# Patient Record
Sex: MALE | Race: WHITE | NOT HISPANIC OR LATINO | URBAN - METROPOLITAN AREA
[De-identification: names, ages, dates, MRNs, and addresses within clinical notes are randomized per-mention and may not be internally consistent; named-entity substitution may affect disease eponyms.]

---

## 2017-01-01 ENCOUNTER — COMMUNICATION - HEALTHEAST (OUTPATIENT)
Dept: ADMINISTRATIVE | Facility: CLINIC | Age: 82
End: 2017-01-01

## 2017-01-01 ENCOUNTER — OFFICE VISIT - HEALTHEAST (OUTPATIENT)
Dept: INTERNAL MEDICINE | Facility: CLINIC | Age: 82
End: 2017-01-01

## 2017-01-01 ENCOUNTER — COMMUNICATION - HEALTHEAST (OUTPATIENT)
Dept: INTERNAL MEDICINE | Facility: CLINIC | Age: 82
End: 2017-01-01

## 2017-01-01 ENCOUNTER — RECORDS - HEALTHEAST (OUTPATIENT)
Dept: ADMINISTRATIVE | Facility: OTHER | Age: 82
End: 2017-01-01

## 2017-01-01 ENCOUNTER — COMMUNICATION - HEALTHEAST (OUTPATIENT)
Dept: SCHEDULING | Facility: CLINIC | Age: 82
End: 2017-01-01

## 2017-01-01 ENCOUNTER — RECORDS - HEALTHEAST (OUTPATIENT)
Dept: GENERAL RADIOLOGY | Facility: CLINIC | Age: 82
End: 2017-01-01

## 2017-01-01 DIAGNOSIS — I10 ESSENTIAL HYPERTENSION WITH GOAL BLOOD PRESSURE LESS THAN 140/90: ICD-10-CM

## 2017-01-01 DIAGNOSIS — I50.23: ICD-10-CM

## 2017-01-01 DIAGNOSIS — I10 ESSENTIAL HYPERTENSION: ICD-10-CM

## 2017-01-01 DIAGNOSIS — Z95.2 H/O AORTIC VALVE REPLACEMENT: ICD-10-CM

## 2017-01-01 DIAGNOSIS — I71.40 AAA (ABDOMINAL AORTIC ANEURYSM) WITHOUT RUPTURE (H): ICD-10-CM

## 2017-01-01 DIAGNOSIS — N18.30 CKD (CHRONIC KIDNEY DISEASE) STAGE 3, GFR 30-59 ML/MIN (H): ICD-10-CM

## 2017-01-01 DIAGNOSIS — G47.30 SLEEP APNEA: ICD-10-CM

## 2017-01-01 DIAGNOSIS — G40.909 SEIZURE DISORDER (H): ICD-10-CM

## 2017-01-01 DIAGNOSIS — R09.02 HYPOXIA: ICD-10-CM

## 2017-01-01 DIAGNOSIS — I50.23 ACUTE ON CHRONIC SYSTOLIC (CONGESTIVE) HEART FAILURE (H): ICD-10-CM

## 2017-01-01 DIAGNOSIS — I25.10 ATHEROSCLEROSIS OF NATIVE CORONARY ARTERY OF NATIVE HEART WITHOUT ANGINA PECTORIS: ICD-10-CM

## 2017-01-01 DIAGNOSIS — I48.20 CHRONIC ATRIAL FIBRILLATION (H): ICD-10-CM

## 2017-01-01 DIAGNOSIS — I50.20 SYSTOLIC HEART FAILURE (H): ICD-10-CM

## 2017-01-01 DIAGNOSIS — I50.32 CHRONIC DIASTOLIC CHF (CONGESTIVE HEART FAILURE), NYHA CLASS 1 (H): ICD-10-CM

## 2017-01-01 DIAGNOSIS — G40.209 COMPLEX PARTIAL SEIZURE (H): ICD-10-CM

## 2017-01-01 DIAGNOSIS — I50.30 DIASTOLIC HEART FAILURE (H): ICD-10-CM

## 2017-01-01 DIAGNOSIS — R06.09 DYSPNEA ON EXERTION: ICD-10-CM

## 2017-01-01 DIAGNOSIS — H35.30 MACULAR DEGENERATION: ICD-10-CM

## 2017-01-01 DIAGNOSIS — H61.23 BILATERAL IMPACTED CERUMEN: ICD-10-CM

## 2017-01-01 DIAGNOSIS — I25.10 ASHD (ARTERIOSCLEROTIC HEART DISEASE): ICD-10-CM

## 2017-01-01 DIAGNOSIS — R06.09 OTHER FORMS OF DYSPNEA: ICD-10-CM

## 2017-01-01 ASSESSMENT — MIFFLIN-ST. JEOR
SCORE: 1486.8
SCORE: 1468.65
SCORE: 1473.19
SCORE: 1459.58
SCORE: 1450.51
SCORE: 1482.26
SCORE: 1464.12
SCORE: 1473.19
SCORE: 1514.01
SCORE: 1473.19
SCORE: 1514.01
SCORE: 1509.47
SCORE: 1459.58

## 2017-01-10 ENCOUNTER — COMMUNICATION - HEALTHEAST (OUTPATIENT)
Dept: INTERNAL MEDICINE | Facility: CLINIC | Age: 82
End: 2017-01-10

## 2017-01-11 RX ORDER — LOSARTAN POTASSIUM 25 MG/1
25 TABLET ORAL DAILY
Qty: 90 TABLET | Refills: 3 | Status: SHIPPED | OUTPATIENT
Start: 2017-01-11

## 2018-01-01 ENCOUNTER — RECORDS - HEALTHEAST (OUTPATIENT)
Dept: ADMINISTRATIVE | Facility: OTHER | Age: 83
End: 2018-01-01

## 2021-05-24 ENCOUNTER — RECORDS - HEALTHEAST (OUTPATIENT)
Dept: ADMINISTRATIVE | Facility: CLINIC | Age: 86
End: 2021-05-24

## 2021-05-25 ENCOUNTER — RECORDS - HEALTHEAST (OUTPATIENT)
Dept: ADMINISTRATIVE | Facility: CLINIC | Age: 86
End: 2021-05-25

## 2021-05-26 ENCOUNTER — RECORDS - HEALTHEAST (OUTPATIENT)
Dept: ADMINISTRATIVE | Facility: CLINIC | Age: 86
End: 2021-05-26

## 2021-05-28 ENCOUNTER — RECORDS - HEALTHEAST (OUTPATIENT)
Dept: ADMINISTRATIVE | Facility: CLINIC | Age: 86
End: 2021-05-28

## 2021-05-29 ENCOUNTER — RECORDS - HEALTHEAST (OUTPATIENT)
Dept: ADMINISTRATIVE | Facility: CLINIC | Age: 86
End: 2021-05-29

## 2021-05-30 ENCOUNTER — RECORDS - HEALTHEAST (OUTPATIENT)
Dept: ADMINISTRATIVE | Facility: CLINIC | Age: 86
End: 2021-05-30

## 2021-05-30 VITALS — HEIGHT: 70 IN | BODY MASS INDEX: 25.91 KG/M2 | WEIGHT: 181 LBS

## 2021-05-30 VITALS — HEIGHT: 70 IN | WEIGHT: 176 LBS | BODY MASS INDEX: 25.2 KG/M2

## 2021-05-30 VITALS — WEIGHT: 177 LBS | HEIGHT: 70 IN | BODY MASS INDEX: 25.34 KG/M2

## 2021-05-30 VITALS — HEIGHT: 70 IN | WEIGHT: 182 LBS | BODY MASS INDEX: 26.05 KG/M2

## 2021-05-30 VITALS — BODY MASS INDEX: 25.62 KG/M2 | HEIGHT: 70 IN | WEIGHT: 179 LBS

## 2021-05-30 VITALS — HEIGHT: 70 IN | BODY MASS INDEX: 25.62 KG/M2 | WEIGHT: 179 LBS

## 2021-05-31 ENCOUNTER — RECORDS - HEALTHEAST (OUTPATIENT)
Dept: ADMINISTRATIVE | Facility: CLINIC | Age: 86
End: 2021-05-31

## 2021-05-31 VITALS — BODY MASS INDEX: 24.38 KG/M2 | WEIGHT: 180 LBS | HEIGHT: 72 IN

## 2021-05-31 VITALS — WEIGHT: 179 LBS | HEIGHT: 70 IN | BODY MASS INDEX: 25.62 KG/M2

## 2021-05-31 VITALS — BODY MASS INDEX: 24.91 KG/M2 | WEIGHT: 174 LBS | HEIGHT: 70 IN

## 2021-05-31 VITALS — BODY MASS INDEX: 25.48 KG/M2 | WEIGHT: 178 LBS | HEIGHT: 70 IN

## 2021-05-31 VITALS — HEIGHT: 72 IN | WEIGHT: 181 LBS | BODY MASS INDEX: 24.52 KG/M2

## 2021-05-31 VITALS — WEIGHT: 176 LBS | BODY MASS INDEX: 25.2 KG/M2 | HEIGHT: 70 IN

## 2021-06-05 ENCOUNTER — RECORDS - HEALTHEAST (OUTPATIENT)
Dept: ADMINISTRATIVE | Facility: CLINIC | Age: 86
End: 2021-06-05

## 2021-06-08 NOTE — PROGRESS NOTES
OFFICE VISIT NOTE    Subjective:   Chief Complaint:  Hypertension (follow up lower metoporol and added losartan)    87-year-old male with history of chronic atrial fibrillation, congestive heart, hypertension.  Recently, I reduced the dosage of metoprolol since it was causing fatigue.  It actually has not helped much.  He still complains of fatigue and some depression.  He basically is bored.  Chest pain or shortness of breath.    Current Outpatient Prescriptions   Medication Sig     acetaminophen (TYLENOL) 325 MG tablet Take 650 mg by mouth every 6 (six) hours as needed for pain.     aspirin 81 MG EC tablet Take 81 mg by mouth daily.     atorvastatin (LIPITOR) 80 MG tablet Take 40 mg by mouth bedtime.     CALCIUM CARB/VIT D3/MINERALS (CALCIUM CARBONATE-VIT D3-MIN) 600 mg (1,500 mg)-400 unit Chew Chew 1 tablet 2 (two) times a day.     cholecalciferol, vitamin D3, 1,000 unit tablet Take 1,000 Units by mouth daily.     divalproex (DEPAKOTE) 500 MG 24 hr tablet Take 2 tablets (1,000 mg total) by mouth daily.     furosemide (LASIX) 40 MG tablet Take 40 mg by mouth daily.     hydrocortisone 1 % cream Apply 1 application topically bedtime as needed (itching, dry skin).      losartan (COZAAR) 25 MG tablet Take 1 tablet (25 mg total) by mouth daily.     melatonin 3 mg Tab tablet Take 3 mg by mouth bedtime. Take two tablets by mouth daily at bedtime. (Dx:Insomnia)  Indications: insomnia     metoprolol succinate (TOPROL XL) 50 MG 24 hr tablet Take 1 tablet (50 mg total) by mouth daily.     MULTIVIT-MIN/FA/LYCOPEN/LUTEIN (CERTAVITE SENIOR-ANTIOXIDANT ORAL) Take 1 tablet by mouth daily.     omeprazole (PRILOSEC) 20 MG capsule Take 20 mg by mouth Daily before breakfast.      potassium chloride (KLOR-CON) 10 MEQ CR tablet Take 10 mEq by mouth daily.     potassium chloride SA (K-DUR,KLOR-CON) 20 MEQ tablet Take 1 tablet (20 mEq total) by mouth daily.     VIT A/VIT C/VIT E/ZINC/COPPER (ICAPS AREDS ORAL) Take 1 capsule by mouth 2  "(two) times a day.        PSFHx: Tobacco Status:  He  reports that he quit smoking about 40 years ago. His smoking use included Cigarettes. He has a 15.00 pack-year smoking history. He has never used smokeless tobacco.    Review of Systems:  A comprehensive review of systems is negative except for the comments above    Objective:      Visit Vitals     Ht 5' 10\" (1.778 m)     Wt 179 lb (81.2 kg)     BMI 25.68 kg/m2     GENERAL: No acute distress.  Blood pressure 134/82.  Pulse 76 and irregular.  Lungs are clear today.  Oxygen saturation excellent at 98%.  No edema.  Heart shows atrial fibrillation with controlled ventricular rate.  Neurologically seems stable.  He is a little hard of hearing but speech is normal.  Swallowing is normal.  All cranial nerves seem intact.  He ambulates with the use of a walker.    Assessment & Plan   Tushar Eden is a 87 y.o. male.    Clinically stable.  There is some depression.  I want to avoid any medication that might be new at this time.  He'll try to increase his social life and see if things get better.  (Back in about 8 weeks.  If depression becomes a major issue, we'll consider a medicine such as sertraline at low doses.    Diagnoses and all orders for this visit:    Atherosclerosis of native coronary artery of native heart without angina pectoris    Essential hypertension with goal blood pressure less than 140/90    Chronic atrial fibrillation    CKD (chronic kidney disease) stage 3, GFR 30-59 ml/min            Gustavo Zuleta MD  Transcription using voice recognition software, may contain typographical errors.    "

## 2021-06-09 ENCOUNTER — RECORDS - HEALTHEAST (OUTPATIENT)
Dept: ADMINISTRATIVE | Facility: CLINIC | Age: 86
End: 2021-06-09

## 2021-06-09 NOTE — PROGRESS NOTES
"Office Visit - Follow up    Tushar Eden   87 y.o. male    Date of Visit: 2/27/2017    Chief Complaint   Patient presents with     Shortness of Breath     For 1.5 weeks       Subjective: Pleasant elderly gentleman patient of Dr. Harper with history of coronary disease PMR previous aortic valve replacement chronic renal insufficiency chronic atrial fibrillation mild anxiety and stable heart failure    Complains today of increasing problems with dyspnea and shortness of breath without wheezing.  Weight is up about 4-5 pounds.    Previous echo reviewed in detail had normal ejection fraction but evidence of diastolic dysfunction and right ventricular systolic function with biatrial enlargement and normally functioning aortic prosthesis.    Denies symptoms of cough or a bronchitis or of acute respiratory illness    Current medications reviewed and discussed and reconciled    Has had minimal symptoms of orthopnea most of his dyspnea is exertional    No recent symptoms of angina denies hemoptysis or other new concerns has noted mild peripheral edema is well    ROS: A comprehensive review of systems was performed and was otherwise negative except as mentioned above.     Exam  Mild JVD extremities 2+ edema lungs no rales heart systolic murmur as noted PMI normal abdomen negative     Visit Vitals     /70     Pulse (!) 46     Ht 5' 10\" (1.778 m)     Wt 182 lb (82.6 kg)     SpO2 93%     BMI 26.11 kg/m2       Assessment and Plan   Appears to have mild exacerbation of systolic and diastolic heart failure.  We are rechecking renal function BNP etc.    I will have him increase furosemide to 80 mg daily for the next several days I've asked him to follow his weights at home and estimate of urinary output    Follow-up with Dr. Harper next week.  Recheck renal function potassium etc. next week per Dr. Harper judgment    Labs reviewed post visit BM P noted modest increase in creatinine    BNP elevated supports evidence for " mild exacerbation of heart failure    Tushar was seen today for shortness of breath.    Diagnoses and all orders for this visit:    Dyspnea on exertion  -     HM2(CBC w/o Differential); Future  -     Comprehensive Metabolic Panel; Future  -     BNP(B-type Natriuretic Peptide)  -     Cancel: XR Chest PA and Lateral  -     HM2(CBC w/o Differential)  -     Comprehensive Metabolic Panel  -     XR Chest AP    Atherosclerosis of native coronary artery of native heart without angina pectoris  -     HM2(CBC w/o Differential); Future  -     Comprehensive Metabolic Panel; Future  -     BNP(B-type Natriuretic Peptide)  -     Cancel: XR Chest PA and Lateral  -     HM2(CBC w/o Differential)  -     Comprehensive Metabolic Panel  -     XR Chest AP    CKD (chronic kidney disease) stage 3, GFR 30-59 ml/min  -     HM2(CBC w/o Differential); Future  -     Comprehensive Metabolic Panel; Future  -     BNP(B-type Natriuretic Peptide)  -     Cancel: XR Chest PA and Lateral  -     HM2(CBC w/o Differential)  -     Comprehensive Metabolic Panel  -     XR Chest AP    Systolic heart failure    Diastolic heart failure          Time: total time spent with the patient was 40 minutes of which >50% was spent in counseling and coordination of care        Allergies   Allergen Reactions     Iodinated Contrast Media - Oral And Iv Dye Unknown     Levetiracetam Other (See Comments)     Reactions: Sleepiness and Irritability     Penicillins Unknown       Medications :  Prior to Admission medications    Medication Sig Start Date End Date Taking? Authorizing Provider   acetaminophen (TYLENOL) 325 MG tablet Take 650 mg by mouth every 6 (six) hours as needed for pain.   Yes PROVIDER, HISTORICAL   aspirin 81 MG EC tablet Take 81 mg by mouth daily.   Yes PROVIDER, HISTORICAL   atorvastatin (LIPITOR) 80 MG tablet Take 40 mg by mouth bedtime.   Yes PROVIDER, HISTORICAL   CALCIUM CARB/VIT D3/MINERALS (CALCIUM CARBONATE-VIT D3-MIN) 600 mg (1,500 mg)-400 unit Chew  Chew 1 tablet 2 (two) times a day.   Yes PROVIDER, HISTORICAL   cholecalciferol, vitamin D3, 1,000 unit tablet Take 1,000 Units by mouth daily.   Yes PROVIDER, HISTORICAL   divalproex (DEPAKOTE) 500 MG 24 hr tablet Take 2 tablets (1,000 mg total) by mouth daily. 6/22/16  Yes Gustavo Zuleta MD   furosemide (LASIX) 40 MG tablet Take 40 mg by mouth daily.   Yes PROVIDER, HISTORICAL   hydrocortisone 1 % cream Apply 1 application topically bedtime as needed (itching, dry skin).    Yes PROVIDER, HISTORICAL   losartan (COZAAR) 25 MG tablet Take 1 tablet (25 mg total) by mouth daily. 1/11/17  Yes Gustavo Zuleta MD   melatonin 3 mg Tab tablet Take 3 mg by mouth bedtime. Take two tablets by mouth daily at bedtime. (Dx:Insomnia)  Indications: insomnia 7/22/16  Yes Liz Lazaro CNP   metoprolol succinate (TOPROL XL) 50 MG 24 hr tablet Take 1 tablet (50 mg total) by mouth daily.  Patient taking differently: Take 50 mg by mouth daily. Takes 1/2 tab daily 4/14/16  Yes Florentin Solorzano MD   MULTIVIT-MIN/FA/LYCOPEN/LUTEIN (CERTAVITE SENIOR-ANTIOXIDANT ORAL) Take 1 tablet by mouth daily.   Yes PROVIDER, HISTORICAL   omeprazole (PRILOSEC) 20 MG capsule Take 20 mg by mouth Daily before breakfast.    Yes PROVIDER, HISTORICAL   potassium chloride SA (K-DUR,KLOR-CON) 20 MEQ tablet Take 1 tablet (20 mEq total) by mouth daily. 8/19/16  Yes Pablo Mcdaniel MD   VIT A/VIT C/VIT E/ZINC/COPPER (ICAPS AREDS ORAL) Take 1 capsule by mouth 2 (two) times a day.    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      Subarachnoid bleed     while anticoagulated       Past Surgical History:   Past Surgical History:   Procedure Laterality Date     EYE SURGERY Right     cataract     INGUINAL HERNIA REPAIR Left      ORIF HIP FRACTURE Right      PICC AND MIDLINE TEAM LINE INSERTION  6/15/2016          SD ABLATE/ RECONSTUCT ATRIA, LIMITED  11/20/2008    Maze Procedure     SD RPLCMT PROST AORTIC VALVE OPEN XCP HOMOGRF/STENT  11/20/2008    tissue type     TONSILLECTOMY         Social History:   Social History     Social History     Marital status:      Spouse name: N/A     Number of children: N/A     Years of education: N/A     Occupational History     Not on file.     Social History Main Topics     Smoking status: Former Smoker     Packs/day: 0.50     Years: 30.00     Types: Cigarettes     Quit date: 1/1/1977     Smokeless tobacco: Never Used     Alcohol use No     Drug use: No     Sexual activity: Not on file     Other Topics Concern     Not on file     Social History Narrative        7 children    Currently in [place] wife 6/2016       Family History:   Family History   Problem Relation Age of Onset     Heart disease Mother      Heart disease Father          Tj Garcia MD

## 2021-06-09 NOTE — PROGRESS NOTES
OFFICE VISIT NOTE    Subjective:   Chief Complaint:  Follow-up (CHF)    87-year-old man in for follow-up regarding hypertension, aortic valve replacement, chronic atrial fibrillation.  He has had a pretty good month.  He denies any increasing shortness of breath.  No nighttime coughing.  Certainly no chest pain or any fevers or chills.  No falls or injuries.    Current Outpatient Prescriptions   Medication Sig     acetaminophen (TYLENOL) 325 MG tablet Take 650 mg by mouth every 6 (six) hours as needed for pain.     aspirin 81 MG EC tablet Take 81 mg by mouth daily.     atorvastatin (LIPITOR) 80 MG tablet Take 40 mg by mouth bedtime.     CALCIUM CARB/VIT D3/MINERALS (CALCIUM CARBONATE-VIT D3-MIN) 600 mg (1,500 mg)-400 unit Chew Chew 1 tablet 2 (two) times a day.     cholecalciferol, vitamin D3, 1,000 unit tablet Take 1,000 Units by mouth daily.     divalproex (DEPAKOTE) 500 MG 24 hr tablet Take 2 tablets (1,000 mg total) by mouth daily.     furosemide (LASIX) 40 MG tablet Take 40 mg by mouth daily.     hydrocortisone 1 % cream Apply 1 application topically bedtime as needed (itching, dry skin).      losartan (COZAAR) 25 MG tablet Take 1 tablet (25 mg total) by mouth daily.     melatonin 3 mg Tab tablet Take 3 mg by mouth bedtime. Take two tablets by mouth daily at bedtime. (Dx:Insomnia)  Indications: insomnia     metoprolol succinate (TOPROL XL) 50 MG 24 hr tablet Take 1 tablet (50 mg total) by mouth daily. (Patient taking differently: Take 50 mg by mouth daily. Takes 1/2 tab daily)     MULTIVIT-MIN/FA/LYCOPEN/LUTEIN (CERTAVITE SENIOR-ANTIOXIDANT ORAL) Take 1 tablet by mouth daily.     omeprazole (PRILOSEC) 20 MG capsule Take 20 mg by mouth Daily before breakfast.      potassium chloride SA (K-DUR,KLOR-CON) 20 MEQ tablet Take 1 tablet (20 mEq total) by mouth daily.     VIT A/VIT C/VIT E/ZINC/COPPER (ICAPS AREDS ORAL) Take 1 capsule by mouth 2 (two) times a day.        PSFHx: Tobacco Status:  He  reports that he quit  "smoking about 40 years ago. His smoking use included Cigarettes. He has a 15.00 pack-year smoking history. He has never used smokeless tobacco.    Review of Systems:  A comprehensive review of systems is negative except for the comments above    Objective:    Pulse 60  Ht 5' 10\" (1.778 m)  Wt 177 lb (80.3 kg)  SpO2 93%  BMI 25.4 kg/m2  GENERAL: No acute distress.  Weight is stable.  Blood pressure today is 128/78.  Pulse is 85 and atrial fibrillation is the rhythm.  There is no significant edema.  No JVD.  Lungs are clear there are no rales heard.  Heart shows a prosthetic aortic valve.  Rhythm is atrial fibrillation.  No S3 gallop.  Neurological exam is normal there is a little hard of hearing and he also has slightly decreased vision.  He ambulates with the use of a walker.  The abdomen is without ascites or any tenderness.    Assessment & Plan   Tushar Eden is a 87 y.o. male.    Clinically he is stable.  His medicines will remain the same.  I will see him back in 1 month.  We will draw a basic metabolic profile, hemoglobin, and BNP level at that time.    Diagnoses and all orders for this visit:    H/O aortic valve replacement    Atherosclerosis of native coronary artery of native heart without angina pectoris    Essential hypertension with goal blood pressure less than 140/90        The following high BMI interventions were performed this visit: encouragement to exercise    Gustavo Zuleta MD  Transcription using voice recognition software, may contain typographical errors.    "

## 2021-06-09 NOTE — PROGRESS NOTES
OFFICE VISIT NOTE    Subjective:   Chief Complaint:  Follow-up (1 week follow SOB saw Dr. Garcia last week, sleeping alot)    87-year-old man in for follow-up regarding recent flare of congestive heart failure.  He has increased his Lasix dosage for the past several days and his diuretics and his breathing is much improved.  No chest pains reported.  No fever.  No orthopnea.    Current Outpatient Prescriptions   Medication Sig     acetaminophen (TYLENOL) 325 MG tablet Take 650 mg by mouth every 6 (six) hours as needed for pain.     aspirin 81 MG EC tablet Take 81 mg by mouth daily.     atorvastatin (LIPITOR) 80 MG tablet Take 40 mg by mouth bedtime.     CALCIUM CARB/VIT D3/MINERALS (CALCIUM CARBONATE-VIT D3-MIN) 600 mg (1,500 mg)-400 unit Chew Chew 1 tablet 2 (two) times a day.     cholecalciferol, vitamin D3, 1,000 unit tablet Take 1,000 Units by mouth daily.     divalproex (DEPAKOTE) 500 MG 24 hr tablet Take 2 tablets (1,000 mg total) by mouth daily.     furosemide (LASIX) 40 MG tablet Take 40 mg by mouth daily.     hydrocortisone 1 % cream Apply 1 application topically bedtime as needed (itching, dry skin).      losartan (COZAAR) 25 MG tablet Take 1 tablet (25 mg total) by mouth daily.     melatonin 3 mg Tab tablet Take 3 mg by mouth bedtime. Take two tablets by mouth daily at bedtime. (Dx:Insomnia)  Indications: insomnia     metoprolol succinate (TOPROL XL) 50 MG 24 hr tablet Take 1 tablet (50 mg total) by mouth daily. (Patient taking differently: Take 50 mg by mouth daily. Takes 1/2 tab daily)     MULTIVIT-MIN/FA/LYCOPEN/LUTEIN (CERTAVITE SENIOR-ANTIOXIDANT ORAL) Take 1 tablet by mouth daily.     omeprazole (PRILOSEC) 20 MG capsule Take 20 mg by mouth Daily before breakfast.      potassium chloride SA (K-DUR,KLOR-CON) 20 MEQ tablet Take 1 tablet (20 mEq total) by mouth daily.     VIT A/VIT C/VIT E/ZINC/COPPER (ICAPS AREDS ORAL) Take 1 capsule by mouth 2 (two) times a day.        PSFHx: Tobacco Status:  He   "reports that he quit smoking about 40 years ago. His smoking use included Cigarettes. He has a 15.00 pack-year smoking history. He has never used smokeless tobacco.    Review of Systems:  A comprehensive review of systems is negative except for the comments above    Objective:      Visit Vitals     Pulse 75     Ht 5' 10\" (1.778 m)     Wt 176 lb (79.8 kg)     SpO2 91%     BMI 25.25 kg/m2     GENERAL: No acute distress.   He is down 7 pounds.  Blood pressure 126/78.  No significant edema.  No presacral edema.  Lungs today are clear.  Heart shows a sinus rhythm.  There is an occasional ectopic beat heard.  Aortic valve replacement in the past.  Oxygen saturations 96% at this time.  No obvious ascites.  He seems sleepy and lethargic.  Skin without jaundice or cyanosis    Assessment & Plan   Tushar Eden is a 87 y.o. male.    He seems to be improved from congestive heart failure standpoint.  I will check a basic metabolic profile.  Check a BNP level.  Return to clinic in 3 weeks.  He has been on Lasix 80 mg daily since last week.  We need to see what kidney function is like.  Hopefully, we can reduce the dosage.    Diagnoses and all orders for this visit:    CKD (chronic kidney disease) stage 3, GFR 30-59 ml/min    H/O aortic valve replacement    Acute on chronic systolic CHF (congestive heart failure), NYHA class 1  -     Basic Metabolic Panel  -     BNP(B-type Natriuretic Peptide)            Gustavo Zuleta MD  Transcription using voice recognition software, may contain typographical errors.    "

## 2021-06-10 NOTE — PROGRESS NOTES
OFFICE VISIT NOTE    Subjective:   Chief Complaint:  Follow-up (AVR, HTN, Afib needs labs today)    A 7-year-old man in for follow-up regarding multiple problems including chronic atrial fibrillation, history of abdominal aortic aneurysm, spontaneous bleeding to the abdominal rectus sheath, congestive heart failure.  Since last seen he says is doing a little better.  No falls or injuries.  No increasing swelling.  No increasing dyspnea or cough.    Current Outpatient Prescriptions   Medication Sig     acetaminophen (TYLENOL) 325 MG tablet Take 650 mg by mouth every 6 (six) hours as needed for pain.     aspirin 81 MG EC tablet Take 81 mg by mouth daily.     atorvastatin (LIPITOR) 80 MG tablet Take 40 mg by mouth bedtime.     CALCIUM CARB/VIT D3/MINERALS (CALCIUM CARBONATE-VIT D3-MIN) 600 mg (1,500 mg)-400 unit Chew Chew 1 tablet 2 (two) times a day.     cholecalciferol, vitamin D3, 1,000 unit tablet Take 1,000 Units by mouth daily.     divalproex (DEPAKOTE) 500 MG 24 hr tablet Take 2 tablets (1,000 mg total) by mouth daily.     furosemide (LASIX) 40 MG tablet Take 40 mg by mouth daily.     hydrocortisone 1 % cream Apply 1 application topically bedtime as needed (itching, dry skin).      losartan (COZAAR) 25 MG tablet Take 1 tablet (25 mg total) by mouth daily.     melatonin 3 mg Tab tablet Take 3 mg by mouth bedtime. Take two tablets by mouth daily at bedtime. (Dx:Insomnia)  Indications: insomnia     metoprolol succinate (TOPROL XL) 50 MG 24 hr tablet Take 1 tablet (50 mg total) by mouth daily. (Patient taking differently: Take 50 mg by mouth daily. Takes 1/2 tab daily)     MULTIVIT-MIN/FA/LYCOPEN/LUTEIN (CERTAVITE SENIOR-ANTIOXIDANT ORAL) Take 1 tablet by mouth daily.     omeprazole (PRILOSEC) 20 MG capsule Take 20 mg by mouth Daily before breakfast.      potassium chloride SA (K-DUR,KLOR-CON) 20 MEQ tablet Take 1 tablet (20 mEq total) by mouth daily.     VIT A/VIT C/VIT E/ZINC/COPPER (ICAPS AREDS ORAL) Take 1  "capsule by mouth 2 (two) times a day.        PSFHx: Tobacco Status:  He  reports that he quit smoking about 40 years ago. His smoking use included Cigarettes. He has a 15.00 pack-year smoking history. He has never used smokeless tobacco.    Review of Systems:  A comprehensive review of systems is negative except for the comments above    Objective:    Pulse 80  Ht 5' 10\" (1.778 m)  Wt 179 lb (81.2 kg)  SpO2 97%  BMI 25.68 kg/m2  GENERAL: No acute distress.  Pressure today is 128/74.  Pulse 85 and 90.  Atrial fibrillation is a rhythm.  There is no significant edema.  Lungs are fairly clear at this time.  Heart shows atrial fibrillation.  No significant gallop is heard.  No diastolic murmurs heard.  He has had aortic valve replaced in the past.  Abdomen is nontender.  Decreased vision related to macular degeneration.  Mentally today sharp and alert and answers all questions appropriately.  He ambulates okay with the use of a walker.    Assessment & Plan   Tushar Eden is a 87 y.o. male.    Medically he seems to be stable.  Will continue same meds.  I will see him again in 2 months.  At that time will need a hemoglobin as well as a basic metabolic profile.    Diagnoses and all orders for this visit:    Essential hypertension with goal blood pressure less than 140/90    Acute on chronic systolic CHF (congestive heart failure), NYHA class 1    Macular degeneration    Chronic atrial fibrillation        The following high BMI interventions were performed this visit: encouragement to exercise    Gustavo Zuleta MD  Transcription using voice recognition software, may contain typographical errors.    "

## 2021-06-10 NOTE — PROGRESS NOTES
OFFICE VISIT NOTE    Subjective:   Chief Complaint:  No chief complaint on file.    7-year-old male with coronary artery disease as well as acute on chronic congestive heart failure.  He is having more trouble with weakness and shortness of breath with any exertion.  Some confusion.  No chest pains.  No fevers or chills.  No increasing cough.  No increasing edema.    Current Outpatient Prescriptions   Medication Sig     acetaminophen (TYLENOL) 325 MG tablet Take 650 mg by mouth every 6 (six) hours as needed for pain.     aspirin 81 MG EC tablet Take 81 mg by mouth daily.     atorvastatin (LIPITOR) 80 MG tablet Take 40 mg by mouth bedtime.     CALCIUM CARB/VIT D3/MINERALS (CALCIUM CARBONATE-VIT D3-MIN) 600 mg (1,500 mg)-400 unit Chew Chew 1 tablet 2 (two) times a day.     cholecalciferol, vitamin D3, 1,000 unit tablet Take 1,000 Units by mouth daily.     divalproex (DEPAKOTE) 500 MG 24 hr tablet Take 2 tablets (1,000 mg total) by mouth daily.     furosemide (LASIX) 40 MG tablet Take 40 mg by mouth daily.     hydrocortisone 1 % cream Apply 1 application topically bedtime as needed (itching, dry skin).      losartan (COZAAR) 25 MG tablet Take 1 tablet (25 mg total) by mouth daily.     metoprolol succinate (TOPROL XL) 50 MG 24 hr tablet Take 1 tablet (50 mg total) by mouth daily. (Patient taking differently: Take 50 mg by mouth daily. Takes 1/2 tab daily)     MULTIVIT-MIN/FA/LYCOPEN/LUTEIN (CERTAVITE SENIOR-ANTIOXIDANT ORAL) Take 1 tablet by mouth daily.     omeprazole (PRILOSEC) 20 MG capsule Take 20 mg by mouth Daily before breakfast.      potassium chloride SA (K-DUR,KLOR-CON) 20 MEQ tablet Take 1 tablet (20 mEq total) by mouth daily.     VIT A/VIT C/VIT E/ZINC/COPPER (ICAPS AREDS ORAL) Take 1 capsule by mouth 2 (two) times a day.        PSFHx: Tobacco Status:  He  reports that he quit smoking about 40 years ago. His smoking use included Cigarettes. He has a 15.00 pack-year smoking history. He has never used  smokeless tobacco.    Review of Systems:  A comprehensive review of systems is negative except for the comments above    Objective:    There were no vitals taken for this visit.  GENERAL: No acute distress.  Blood pressures 140/76.  Pulse 84.  Oxygen saturations 94%.  When he walks 30 yards is drops to 85%.  Respiratory rate increases to 36 after short walk.  Lungs are free of any rales or wheezes  Heart shows irregular rhythm but in the 80s.  Atrial fibrillation seems to be the underlying rhythm.  Neurologic exam shows cranial nerves to be intact.  Speech is normal.  A little sleepy.  Gait is okay with the use of a walker.  His gait is somewhat unstable as his knee tends to flex more than it should.    Assessment & Plan   Tushar Eden is a 87 y.o. male.    Acute on chronic congestive heart failure.  Patient with hypoxia after minor exercise.  We will do an overnight oximetry to see if he requires oxygen at night.  Check a CBC BMP level.  Basic metabolic profile also checked today.    Diagnoses and all orders for this visit:    Atherosclerosis of native coronary artery of native heart without angina pectoris    Essential hypertension with goal blood pressure less than 140/90    Acute on chronic systolic CHF (congestive heart failure), NYHA class 1  -     HM1(CBC and Differential)  -     Basic Metabolic Panel  -     BNP(B-type Natriuretic Peptide)  -     HM1 (CBC with Diff)    Hypoxia  -     Oximetry, overnight w/strip rcdr; Future            Gustavo Zuleta MD  Transcription using voice recognition software, may contain typographical errors.

## 2021-06-11 NOTE — PROGRESS NOTES
OFFICE VISIT NOTE    Subjective:   Chief Complaint:  Follow-up (6 weeks SOB, having a hard time breathing due to cold/allergies per pt)    87-year-old man with a history of hypertension, chronic atrial fibrillation, intolerance to warfarin because of significant bleeding, chronic congestive heart failure.  May be a little better than last visit.  He still gets short of breath with minor activity.  No fevers or chills.  Some congested cough.  No swelling of the ankles.  No chest pains to report.  No syncope.    Current Outpatient Prescriptions   Medication Sig     acetaminophen (TYLENOL) 325 MG tablet Take 650 mg by mouth every 6 (six) hours as needed for pain.     aspirin 81 MG EC tablet Take 81 mg by mouth daily.     atorvastatin (LIPITOR) 80 MG tablet Take 40 mg by mouth bedtime.     CALCIUM CARB/VIT D3/MINERALS (CALCIUM CARBONATE-VIT D3-MIN) 600 mg (1,500 mg)-400 unit Chew Chew 1 tablet 2 (two) times a day.     cholecalciferol, vitamin D3, 1,000 unit tablet Take 1,000 Units by mouth daily.     divalproex (DEPAKOTE) 500 MG 24 hr tablet Take 2 tablets (1,000 mg total) by mouth daily.     furosemide (LASIX) 40 MG tablet Take 40 mg by mouth daily. Alt 40mg and 80mg qod     hydrocortisone 1 % cream Apply 1 application topically bedtime as needed (itching, dry skin).      losartan (COZAAR) 25 MG tablet Take 1 tablet (25 mg total) by mouth daily.     metoprolol succinate (TOPROL XL) 50 MG 24 hr tablet Take 1 tablet (50 mg total) by mouth daily. (Patient taking differently: Take 50 mg by mouth daily. Takes 1/2 tab daily)     omeprazole (PRILOSEC) 20 MG capsule Take 20 mg by mouth Daily before breakfast.      potassium chloride SA (K-DUR,KLOR-CON) 20 MEQ tablet Take 1 tablet (20 mEq total) by mouth daily.     VIT A/VIT C/VIT E/ZINC/COPPER (ICAPS AREDS ORAL) Take 1 capsule by mouth 2 (two) times a day.        PSFHx: Tobacco Status:  He  reports that he quit smoking about 40 years ago. His smoking use included Cigarettes.  "He has a 15.00 pack-year smoking history. He has never used smokeless tobacco.    Review of Systems:  A comprehensive review of systems is negative except for the comments above    Objective:    Pulse 71  Ht 5' 10\" (1.778 m)  Wt 178 lb (80.7 kg)  SpO2 91%  BMI 25.54 kg/m2  GENERAL: No acute distress.  Weight is down 3 pounds.  His blood pressure is 1/28/1986.  Pulse in the mid 80s.  Lungs have decreased breath sounds at the right lung base.  No significant pedal edema.  No cyanosis.  Heart shows atrial fibrillation with a controlled ventricular rate in the mid 80s.  No ascites.  He appears a little pale.    Assessment & Plan   Tushar Eden is a 87 y.o. male.    Chest x-ray today does not reveal any obvious pleural effusion or congestive heart failure.  We will continue with Lasix 80 mg alternating with 40 mg every other day.  Other medications remain the same.  Check hemoglobin and basic metabolic profile.  Return to clinic in about 6 weeks.    Diagnoses and all orders for this visit:    Essential hypertension with goal blood pressure less than 140/90    Acute on chronic systolic CHF (congestive heart failure), NYHA class 1  -     XR Chest PA and Lateral; Future; Expected date: 6/14/17  -     BNP(B-type Natriuretic Peptide)    H/O aortic valve replacement  -     Hemoglobin    Chronic atrial fibrillation  -     Basic Metabolic Panel            Gustavo Zuleta MD  Transcription using voice recognition software, may contain typographical errors.    "

## 2021-06-12 NOTE — PROGRESS NOTES
OFFICE VISIT NOTE    Subjective:   Chief Complaint:  Follow-up (1 month)    7-year-old man in for follow-up regarding multiple problems including TSH, CHF, chronic kidney disease.  He had a past history of significant rectus sheath hematoma.  Because of bleeding complications he is not taking warfarin.  Since last seen is been stable.  He denies any increasing dyspnea on exertion.  No orthopnea.  No chest pain.  No fevers.    Current Outpatient Prescriptions   Medication Sig     acetaminophen (TYLENOL) 325 MG tablet Take 650 mg by mouth every 6 (six) hours as needed for pain.     aspirin 81 MG EC tablet Take 81 mg by mouth daily.     atorvastatin (LIPITOR) 80 MG tablet Take 40 mg by mouth bedtime.     CALCIUM CARB/VIT D3/MINERALS (CALCIUM CARBONATE-VIT D3-MIN) 600 mg (1,500 mg)-400 unit Chew Chew 1 tablet 2 (two) times a day.     cholecalciferol, vitamin D3, 1,000 unit tablet Take 1,000 Units by mouth daily.     divalproex (DEPAKOTE) 500 MG 24 hr tablet Take 2 tablets (1,000 mg total) by mouth daily.     furosemide (LASIX) 40 MG tablet Take 40 mg by mouth daily. Alt 40mg and 80mg qod     hydrocortisone 1 % cream Apply 1 application topically bedtime as needed (itching, dry skin).      losartan (COZAAR) 25 MG tablet Take 1 tablet (25 mg total) by mouth daily.     metoprolol succinate (TOPROL XL) 50 MG 24 hr tablet Take 1 tablet (50 mg total) by mouth daily. (Patient taking differently: Take 50 mg by mouth daily. Takes 1/2 tab daily)     omeprazole (PRILOSEC) 20 MG capsule Take 20 mg by mouth Daily before breakfast.      potassium chloride SA (K-DUR,KLOR-CON) 20 MEQ tablet Take 1 tablet (20 mEq total) by mouth daily.     VIT A/VIT C/VIT E/ZINC/COPPER (ICAPS AREDS ORAL) Take 1 capsule by mouth 2 (two) times a day.        PSFHx: Tobacco Status:  He  reports that he quit smoking about 40 years ago. His smoking use included Cigarettes. He has a 15.00 pack-year smoking history. He has never used smokeless  "tobacco.    Review of Systems:  A comprehensive review of systems is negative except for the comments above    Objective:    Pulse 89  Ht 5' 10\" (1.778 m)  Wt 176 lb (79.8 kg)  SpO2 95%  BMI 25.25 kg/m2  GENERAL: No acute distress.  Weight is about the same.  Blood pressure 124/74.  Oxygen saturations 95% on room air.  There is no edema at 1:30 in the afternoon.  Lungs are clear of any rales.  Heart shows a atrial fibrillation rhythm.  Ventricular rate is controlled.  There is a grade 2/6 systolic ejection murmur sound like mild aortic stenosis.  The abdomen is nontender.  No presacral edema.  He tells me he has worsening macular degeneration.  He seen an eye doctor every month for injections.    Assessment & Plan   Tushar Eden is a 87 y.o. male.    At least stable.  I think he is probably at his baseline.  Medications will remain the same.  I will check his bloods today.  Return to clinic in September the same day his is wife is due for a visit.    Diagnoses and all orders for this visit:    Essential hypertension with goal blood pressure less than 140/90    Acute on chronic systolic CHF (congestive heart failure), NYHA class 1  -     BNP(B-type Natriuretic Peptide)    Macular degeneration    CKD (chronic kidney disease) stage 3, GFR 30-59 ml/min  -     HM2(CBC w/o Differential)  -     Basic Metabolic Panel            Gustavo Zuleta MD  Transcription using voice recognition software, may contain typographical errors.    "

## 2021-06-12 NOTE — PROGRESS NOTES
OFFICE VISIT NOTE    Subjective:   Chief Complaint:  Follow-up    87-year-old male with multiple problems.  He biggest complaint today is lack of endurance.  He denies any chest pain.  He is short of breath but no more so than a month ago.  No chest pains.  No fevers or chills.  Appetite is fair to good.  No falls.    Current Outpatient Prescriptions   Medication Sig     acetaminophen (TYLENOL) 325 MG tablet Take 650 mg by mouth every 6 (six) hours as needed for pain.     aspirin 81 MG EC tablet Take 81 mg by mouth daily.     atorvastatin (LIPITOR) 80 MG tablet Take 40 mg by mouth bedtime.     CALCIUM CARB/VIT D3/MINERALS (CALCIUM CARBONATE-VIT D3-MIN) 600 mg (1,500 mg)-400 unit Chew Chew 1 tablet 2 (two) times a day.     cholecalciferol, vitamin D3, 1,000 unit tablet Take 1,000 Units by mouth daily.     divalproex (DEPAKOTE) 500 MG 24 hr tablet Take 2 tablets (1,000 mg total) by mouth daily.     furosemide (LASIX) 40 MG tablet Take 40 mg by mouth daily. Alt 40mg and 80mg qod     hydrocortisone 1 % cream Apply 1 application topically bedtime as needed (itching, dry skin).      losartan (COZAAR) 25 MG tablet Take 1 tablet (25 mg total) by mouth daily.     metoprolol succinate (TOPROL XL) 50 MG 24 hr tablet Take 1 tablet (50 mg total) by mouth daily. (Patient taking differently: Take 50 mg by mouth daily. Takes 1/2 tab daily)     omeprazole (PRILOSEC) 20 MG capsule Take 20 mg by mouth Daily before breakfast.      potassium chloride SA (K-DUR,KLOR-CON) 20 MEQ tablet Take 1 tablet (20 mEq total) by mouth daily.     VIT A/VIT C/VIT E/ZINC/COPPER (ICAPS AREDS ORAL) Take 1 capsule by mouth 2 (two) times a day.        PSFHx: Tobacco Status:  He  reports that he quit smoking about 40 years ago. His smoking use included Cigarettes. He has a 15.00 pack-year smoking history. He has never used smokeless tobacco.    Review of Systems:  A comprehensive review of systems is negative except for the comments above    Objective:     There were no vitals taken for this visit.  GENERAL: No acute distress.  Weight is down 4 pounds.  Blood pressure 122/72.  Pulse 80.  Oxygen saturation on room air 94%.  After he walks about 50 feet the oxygen sats dropped to 88%.  Pulse goes from 84-96 after walking.  No significant edema.  No JVD.  Lungs have slightly decreased breath sounds.  A few rales at the right lung base.  Heart exam shows chronic atrial fibrillation.  Ventricular rate in the 90s.  Neurologically he is hard of hearing.  He has very poor vision.  Memory slightly impaired.    Assessment & Plan   Tushar Eden is a 87 y.o. male.    Medically he is fairly stable.  He may very well benefit by oxygen at night and/or oxygen with exercise.  At rest he is fine.  For now, medications remain the same.  Return to clinic in 1 month.  At that time I will check a CBC, basic metabolic profile, BNP level.    Diagnoses and all orders for this visit:    Sleep apnea    Essential hypertension with goal blood pressure less than 140/90    AAA (abdominal aortic aneurysm) without rupture    Acute on chronic systolic CHF (congestive heart failure), NYHA class 1    Complex partial seizure    CKD (chronic kidney disease) stage 3, GFR 30-59 ml/min            Gustavo Zuleta MD  Transcription using voice recognition software, may contain typographical errors.

## 2021-06-13 NOTE — PROGRESS NOTES
OFFICE VISIT NOTE    Subjective:   Chief Complaint:  No chief complaint on file.    7-year-old male with multiple problems including history of aortic valve replacement, chronic atrial fibrillation, congestive heart failure.  He is not taking warfarin because of severe significant bleeding in the past.  He has been doing fairly well.  Occasional minor stress incontinence.    Current Outpatient Prescriptions   Medication Sig     acetaminophen (TYLENOL) 325 MG tablet Take 650 mg by mouth every 6 (six) hours as needed for pain.     aspirin 81 MG EC tablet Take 81 mg by mouth daily.     atorvastatin (LIPITOR) 80 MG tablet Take 40 mg by mouth bedtime.     CALCIUM CARB/VIT D3/MINERALS (CALCIUM CARBONATE-VIT D3-MIN) 600 mg (1,500 mg)-400 unit Chew Chew 1 tablet 2 (two) times a day.     cholecalciferol, vitamin D3, 1,000 unit tablet Take 1,000 Units by mouth daily.     divalproex (DEPAKOTE) 500 MG 24 hr tablet Take 2 tablets (1,000 mg total) by mouth daily.     furosemide (LASIX) 40 MG tablet Take 40 mg by mouth daily. Alt 40mg and 80mg qod     hydrocortisone 1 % cream Apply 1 application topically bedtime as needed (itching, dry skin).      losartan (COZAAR) 25 MG tablet Take 1 tablet (25 mg total) by mouth daily.     metoprolol succinate (TOPROL XL) 50 MG 24 hr tablet Take 1 tablet (50 mg total) by mouth daily. (Patient taking differently: Take 50 mg by mouth daily. Takes 1/2 tab daily)     omeprazole (PRILOSEC) 20 MG capsule Take 20 mg by mouth Daily before breakfast.      potassium chloride SA (K-DUR,KLOR-CON) 20 MEQ tablet Take 1 tablet (20 mEq total) by mouth daily.     VIT A/VIT C/VIT E/ZINC/COPPER (ICAPS AREDS ORAL) Take 1 capsule by mouth 2 (two) times a day.        PSFHx: Tobacco Status:  He  reports that he quit smoking about 40 years ago. His smoking use included Cigarettes. He has a 15.00 pack-year smoking history. He has never used smokeless tobacco.    Review of Systems:  A comprehensive review of systems is  negative except for the comments above    Objective:    There were no vitals taken for this visit.  GENERAL: No acute distress.  Vital signs are stable.  Blood pressure 136/80 pulse is 84.  Weight is up to 1/2 pounds.  No edema.  Ears are clear of any rales.  Heart shows atrial fibrillation rhythm with controlled ventricular rate.  No significant murmur.  No ascites.  Finger to nose test is normal.  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  Slightly decreased memory.  Definitely hard of hearing.    Assessment & Plan   Tushar Eden is a 87 y.o. male.    Clinically he seems stable.  Medicines remain the same.  I will see him again in early November.  We will check a hemoglobin and basic metabolic profile at that time.    Diagnoses and all orders for this visit:    H/O aortic valve replacement    CKD (chronic kidney disease) stage 3, GFR 30-59 ml/min    Chronic atrial fibrillation    Acute on chronic systolic CHF (congestive heart failure), NYHA class 1        The following high BMI interventions were performed this visit: encouragement to exercise    Gustavo Zuleta MD  Transcription using voice recognition software, may contain typographical errors.

## 2021-06-13 NOTE — PROGRESS NOTES
OFFICE VISIT NOTE    Subjective:   Chief Complaint:  Hospital Visit Follow Up ([facility] 10/24 Syncope)    87-year-old male with multiple problems including hypertension, aortic valve replacement, chronic kidney disease, who recently had a period of unresponsiveness was brought to the hospital.  He was evaluated extensively.  Felt most likely to have had a seizure.  His Depakote levels were on the low side.  EEG did show some low threshold for seizure activity on the left hemisphere.    Current Outpatient Prescriptions   Medication Sig     acetaminophen (TYLENOL) 325 MG tablet Take 650 mg by mouth every 6 (six) hours as needed for pain.     aspirin 81 MG EC tablet Take 81 mg by mouth daily.     atorvastatin (LIPITOR) 80 MG tablet Take 40 mg by mouth bedtime.     CALCIUM CARB/VIT D3/MINERALS (CALCIUM CARBONATE-VIT D3-MIN) 600 mg (1,500 mg)-400 unit Chew Chew 1 tablet 2 (two) times a day.     cholecalciferol, vitamin D3, 1,000 unit tablet Take 1,000 Units by mouth daily.     divalproex (DEPAKOTE) 125 MG EC tablet Take 125 mg by mouth.     divalproex (DEPAKOTE) 500 MG 24 hr tablet Take 3 tablets (1,500 mg total) by mouth at bedtime.     furosemide (LASIX) 40 MG tablet Take 40 mg by mouth every other day. Alt 40mg and 80mg qod     furosemide (LASIX) 40 MG tablet Take 80 mg by mouth every other day.     hydrocortisone 1 % cream Apply 1 application topically bedtime as needed (itching, dry skin).      losartan (COZAAR) 25 MG tablet Take 1 tablet (25 mg total) by mouth daily.     metoprolol succinate (TOPROL-XL) 25 MG Take 1 tablet (25 mg total) by mouth daily. Takes 1/2 tab daily     omeprazole (PRILOSEC) 20 MG capsule Take 20 mg by mouth Daily before breakfast.      potassium chloride SA (K-DUR,KLOR-CON) 20 MEQ tablet Take 1 tablet (20 mEq total) by mouth daily.     VIT A/VIT C/VIT E/ZINC/COPPER (ICAPS AREDS ORAL) Take 1 capsule by mouth 2 (two) times a day.        PSFHx: Tobacco Status:  He  reports that he quit smoking  about 40 years ago. His smoking use included Cigarettes. He has a 15.00 pack-year smoking history. He has never used smokeless tobacco.    Review of Systems:  A comprehensive review of systems is negative except for the comments above    Objective:    There were no vitals taken for this visit.  GENERAL: No acute distress.  Currently his looks okay.  He is alert no distress.  Blood pressure 126/78.  Pulse is 70.  Oxygen saturations 96%.  No edema.  He does have dry skin and he has been scratching on his legs.  Lungs are clear.  Heart shows a regular rhythm with frequent  ectopic beat  Mentally he is a little forgetful.  He does answer questions appropriately.  No JVD no cyanosis or jaundice.  No ascites.  Finger to nose test is normal.  Toe tap is normal.    Assessment & Plan   Tushar Eden is a 87 y.o. male.    Likely stable.  Will check a valproic acid level today to see if he is therapeutic.  Medicines otherwise remain the same.  He currently is taking 1500 mg of Depakote per day.  Return to clinic in 2-1/2 weeks.    Diagnoses and all orders for this visit:    H/O aortic valve replacement    CKD (chronic kidney disease) stage 3, GFR 30-59 ml/min    Seizure disorder  -     Valproic Acid (Depakene )            Gustavo Zuleta MD  Transcription using voice recognition software, may contain typographical errors.

## 2021-06-14 NOTE — PROGRESS NOTES
OFFICE VISIT NOTE    Subjective:   Chief Complaint:  Follow-up (2 months)    87-year-old male with multiple problems including aortic valve replacement, chronic kidney failure, history of abdominal aortic aneurysm, seizure disorder.  Is now taking Depakote at a dosage of 1500 mg a day.  No breakthrough seizures.  He feels okay.  He denies chest pain or shortness of breath.  There is been no orthopnea or increasing coughing.  No increasing edema.    Current Outpatient Prescriptions   Medication Sig     acetaminophen (TYLENOL) 325 MG tablet Take 650 mg by mouth every 6 (six) hours as needed for pain.     aspirin 81 MG EC tablet Take 81 mg by mouth daily.     atorvastatin (LIPITOR) 80 MG tablet Take 40 mg by mouth bedtime.     CALCIUM CARB/VIT D3/MINERALS (CALCIUM CARBONATE-VIT D3-MIN) 600 mg (1,500 mg)-400 unit Chew Chew 1 tablet 2 (two) times a day.     cholecalciferol, vitamin D3, 1,000 unit tablet Take 1,000 Units by mouth daily.     divalproex (DEPAKOTE) 125 MG EC tablet Take 125 mg by mouth.     divalproex (DEPAKOTE) 500 MG 24 hr tablet Take 3 tablets (1,500 mg total) by mouth at bedtime.     furosemide (LASIX) 40 MG tablet Take 40 mg by mouth every other day. Alt 40mg and 80mg qod     furosemide (LASIX) 40 MG tablet Take 80 mg by mouth every other day.     hydrocortisone 1 % cream Apply 1 application topically bedtime as needed (itching, dry skin).      losartan (COZAAR) 25 MG tablet Take 1 tablet (25 mg total) by mouth daily.     metoprolol succinate (TOPROL-XL) 25 MG Take 1 tablet (25 mg total) by mouth daily. Takes 1/2 tab daily     omeprazole (PRILOSEC) 20 MG capsule Take 20 mg by mouth Daily before breakfast.      potassium chloride SA (K-DUR,KLOR-CON) 20 MEQ tablet Take 1 tablet (20 mEq total) by mouth daily.     VIT A/VIT C/VIT E/ZINC/COPPER (ICAPS AREDS ORAL) Take 1 capsule by mouth 2 (two) times a day.        PSFHx: Tobacco Status:  He  reports that he quit smoking about 40 years ago. His smoking use  included Cigarettes. He has a 15.00 pack-year smoking history. He has never used smokeless tobacco.    Review of Systems:  A comprehensive review of systems is negative except for the comments above    Objective:    Pulse 82  Ht 6' (1.829 m)  Wt 181 lb (82.1 kg)  SpO2 90%  BMI 24.55 kg/m2  GENERAL: No acute distress.  It is up only 1 pound.  Blood pressure is 140/82.  Is 88 and seems fairly regular today.  He does have a history of atrial fibrillation.  No edema.  Lungs have rhonchi but do clear with coughing.  No rales.  No wheezes.  Shows probable atrial fibrillation rhythm.  Ventricular rate is controlled.  No significant no murmurs.  No JVD.  Decreased range of motion of the right shoulder.  Speech is normal memory is fairly good for age today.    Assessment & Plan   Tushar Eden is a 87 y.o. male.    Medically stable.  We will continue the same dosage of Depakote.  I will see him again in 2 months.  At that time we will draw a Depakote level along with a hemoglobin and basic metabolic profile.    Diagnoses and all orders for this visit:    H/O aortic valve replacement    ASHD (arteriosclerotic heart disease)    CKD (chronic kidney disease) stage 3, GFR 30-59 ml/min    Seizure disorder            Gustavo Zuleta MD  Transcription using voice recognition software, may contain typographical errors.

## 2021-06-15 NOTE — PROGRESS NOTES
OFFICE VISIT NOTE    Subjective:   Chief Complaint:  Hospital Visit Follow Up ([facility], 12/17-12/19 [facility])    88-year-old male with multiple problems including history of complex partial seizures, atrial fibrillation, congestive heart failure, chronic kidney disease.  Recently hospitalized with syncope felt related to a seizure.  His medication Depakote was increased to 2000 mg daily he was eventually discharged.  No more seizures.  Patient is complaining of decreased hearing.  No complaints of shortness of breath.    Current Outpatient Prescriptions   Medication Sig     acetaminophen (TYLENOL) 325 MG tablet Take 650 mg by mouth every 6 (six) hours as needed for pain.     aspirin 81 MG EC tablet Take 81 mg by mouth daily.     atorvastatin (LIPITOR) 80 MG tablet Take 40 mg by mouth bedtime.     CALCIUM CARB/VIT D3/MINERALS (CALCIUM CARBONATE-VIT D3-MIN) 600 mg (1,500 mg)-400 unit Chew Chew 1 tablet 2 (two) times a day.     DEXT 70/POLYCARBOPHIL/PEG/NACL (ARTIFICIAL TEAR SOLUTION OPHT) Apply 2 drops to eye every morning. To both eyes     divalproex (DEPAKOTE) 500 MG 24 hr tablet Take 4 tablets (2,000 mg total) by mouth at bedtime.     furosemide (LASIX) 40 MG tablet Take 40 mg by mouth every other day. Alt 40mg and 80mg qod     furosemide (LASIX) 40 MG tablet Take 80 mg by mouth every other day.     hydrocortisone 1 % cream Apply 1 application topically 2 (two) times a day.      losartan (COZAAR) 25 MG tablet Take 1 tablet (25 mg total) by mouth daily.     metoprolol succinate (TOPROL-XL) 25 MG Take 25 mg by mouth daily.     multivitamin therapeutic tablet Take 1 tablet by mouth daily.     omeprazole (PRILOSEC) 20 MG capsule Take 20 mg by mouth Daily before breakfast.      potassium chloride SA (K-DUR,KLOR-CON) 20 MEQ tablet Take 1 tablet (20 mEq total) by mouth daily.     VIT A/VIT C/VIT E/ZINC/COPPER (ICAPS AREDS ORAL) Take 1 capsule by mouth 2 (two) times a day.        PSFHx: Tobacco Status:  He  reports that he quit  smoking about 41 years ago. His smoking use included Cigarettes. He has a 15.00 pack-year smoking history. He has never used smokeless tobacco.    Review of Systems:  A comprehensive review of systems is negative except for the comments above    Objective:    Pulse (!) 46  Ht 6' (1.829 m)  Wt 181 lb (82.1 kg)  SpO2 100%  BMI 24.55 kg/m2  GENERAL: No acute distress.  Blood pressures on the low side 126/62.  Pulse is 80.  Oxygen saturations 97%.  Both ears are occluded with cerumen, left side much worse than the right.  Lungs are clear.  Heart shows atrial fibrillation.  1+ edema of the lower legs.  Very hard of hearing.  Neurologic exam otherwise normal.  Uses a walker for ambulation.    Assessment & Plan   Tushar Eden is a 88 y.o. male.    Her disorder.  Recheck valproic acid level.  Adjust dosage of Depakote as needed.  Check a potassium level.  I attempted to remove cerumen from the ears.  Most of the wax in the right ear is been removed.  I was able to get 75% of the wax out of  the left ear.  I used a cerumen spoon as well as warm water and peroxide irrigation to remove the cerumen.  He is extremely jumpy and moves his head constantly when I tried to even stick a Q-tip in the ear.  I was unable to get the ear completely cleaned out.  He would not even allow me to use warm water and peroxide, without being extremely jumpy and fighting the procedure.    Diagnoses and all orders for this visit:    Seizure disorder  -     Valproic Acid (Depakene )    Essential hypertension  -     Potassium    Chronic diastolic CHF (congestive heart failure), NYHA class 1    Bilateral impacted cerumen            Gustavo Zuleta MD  Transcription using voice recognition software, may contain typographical errors.

## 2021-06-16 PROBLEM — G40.909 SEIZURE DISORDER (H): Status: ACTIVE | Noted: 2017-01-01

## 2021-06-16 PROBLEM — N30.90 CYSTITIS: Status: ACTIVE | Noted: 2017-01-01

## 2021-08-03 PROBLEM — R56.9 SEIZURE (H): Status: RESOLVED | Noted: 2017-01-01 | Resolved: 2017-01-01
